# Patient Record
Sex: MALE | Race: WHITE | NOT HISPANIC OR LATINO | URBAN - METROPOLITAN AREA
[De-identification: names, ages, dates, MRNs, and addresses within clinical notes are randomized per-mention and may not be internally consistent; named-entity substitution may affect disease eponyms.]

---

## 2019-06-20 ENCOUNTER — IMPORTED ENCOUNTER (OUTPATIENT)
Dept: URBAN - METROPOLITAN AREA CLINIC 50 | Facility: CLINIC | Age: 59
End: 2019-06-20

## 2020-07-16 ENCOUNTER — IMPORTED ENCOUNTER (OUTPATIENT)
Dept: URBAN - METROPOLITAN AREA CLINIC 50 | Facility: CLINIC | Age: 60
End: 2020-07-16

## 2021-04-17 ASSESSMENT — VISUAL ACUITY
OD_CC: J1+
OD_CC: J1+@ 15 IN
OD_SC: 20/20
OS_CC: J1+@ 15 IN
OD_SC: 20/20-1
OS_SC: 20/30-1
OS_CC: J1+
OS_SC: 20/20-2

## 2021-04-17 ASSESSMENT — TONOMETRY
OD_IOP_MMHG: 14
OS_IOP_MMHG: 14
OD_IOP_MMHG: 13
OS_IOP_MMHG: 14

## 2021-07-09 ENCOUNTER — PREPPED CHART (OUTPATIENT)
Dept: URBAN - METROPOLITAN AREA CLINIC 48 | Facility: CLINIC | Age: 61
End: 2021-07-09

## 2021-07-09 ASSESSMENT — TONOMETRY
OS_IOP_MMHG: 14
OD_IOP_MMHG: 14

## 2021-07-09 ASSESSMENT — VISUAL ACUITY
OS_SC: 20/30-1
OS_CC: J1+
OD_SC: 20/20
OD_CC: J1+

## 2021-07-28 ENCOUNTER — ROUTINE EXAM (OUTPATIENT)
Dept: URBAN - METROPOLITAN AREA CLINIC 48 | Facility: CLINIC | Age: 61
End: 2021-07-28

## 2021-07-28 DIAGNOSIS — Z01.01: ICD-10-CM

## 2021-07-28 DIAGNOSIS — H52.4: ICD-10-CM

## 2021-07-28 PROCEDURE — 92014 COMPRE OPH EXAM EST PT 1/>: CPT

## 2021-07-28 PROCEDURE — 92015 DETERMINE REFRACTIVE STATE: CPT

## 2021-07-28 ASSESSMENT — VISUAL ACUITY
OS_PH: 20/20-2
OD_SC: 20/20
OU_CC: J1+
OU_SC: 20/20
OS_SC: 20/30+2

## 2021-07-28 ASSESSMENT — KERATOMETRY
OS_AXISANGLE_DEGREES: 170
OS_AXISANGLE2_DEGREES: 80
OD_AXISANGLE2_DEGREES: 180
OD_K1POWER_DIOPTERS: 42.25
OD_AXISANGLE_DEGREES: 090
OS_K2POWER_DIOPTERS: 42.25
OD_K2POWER_DIOPTERS: 42.25
OS_K1POWER_DIOPTERS: 42.50

## 2021-07-28 ASSESSMENT — TONOMETRY
OS_IOP_MMHG: 14
OD_IOP_MMHG: 14

## 2022-07-28 ENCOUNTER — COMPREHENSIVE EXAM (OUTPATIENT)
Dept: URBAN - METROPOLITAN AREA CLINIC 48 | Facility: CLINIC | Age: 62
End: 2022-07-28

## 2022-07-28 DIAGNOSIS — H43.812: ICD-10-CM

## 2022-07-28 DIAGNOSIS — H25.13: ICD-10-CM

## 2022-07-28 DIAGNOSIS — H52.4: ICD-10-CM

## 2022-07-28 DIAGNOSIS — Z01.01: ICD-10-CM

## 2022-07-28 DIAGNOSIS — H40.013: ICD-10-CM

## 2022-07-28 PROCEDURE — 92015 DETERMINE REFRACTIVE STATE: CPT

## 2022-07-28 PROCEDURE — 92014 COMPRE OPH EXAM EST PT 1/>: CPT

## 2022-07-28 ASSESSMENT — KERATOMETRY
OD_K2POWER_DIOPTERS: 42.25
OS_K1POWER_DIOPTERS: 42.50
OS_AXISANGLE_DEGREES: 170
OD_K1POWER_DIOPTERS: 42.25
OD_AXISANGLE_DEGREES: 090
OD_AXISANGLE2_DEGREES: 180
OS_AXISANGLE2_DEGREES: 80
OS_K2POWER_DIOPTERS: 42.25

## 2022-07-28 ASSESSMENT — VISUAL ACUITY
OU_CC: J1+ @16IN
OS_SC: 20/30
OD_GLARE: 20/30
OS_PH: 20/25-1
OS_GLARE: 20/40
OD_GLARE: 20/50
OS_GLARE: 20/30
OD_SC: 20/20

## 2022-07-28 ASSESSMENT — TONOMETRY
OD_IOP_MMHG: 16
OS_IOP_MMHG: 16

## 2022-11-01 ENCOUNTER — DIAGNOSTICS ONLY (OUTPATIENT)
Dept: URBAN - METROPOLITAN AREA CLINIC 53 | Facility: CLINIC | Age: 62
End: 2022-11-01

## 2022-11-01 DIAGNOSIS — H40.013: ICD-10-CM

## 2022-11-01 PROCEDURE — 92133 CPTRZD OPH DX IMG PST SGM ON: CPT

## 2022-11-01 PROCEDURE — 92083 EXTENDED VISUAL FIELD XM: CPT

## 2022-11-01 ASSESSMENT — KERATOMETRY
OD_AXISANGLE_DEGREES: 090
OD_K2POWER_DIOPTERS: 42.25
OS_AXISANGLE_DEGREES: 170
OD_AXISANGLE2_DEGREES: 180
OS_K1POWER_DIOPTERS: 42.50
OS_K2POWER_DIOPTERS: 42.25
OS_AXISANGLE2_DEGREES: 80
OD_K1POWER_DIOPTERS: 42.25

## 2023-08-01 ENCOUNTER — COMPREHENSIVE EXAM (OUTPATIENT)
Dept: URBAN - METROPOLITAN AREA CLINIC 48 | Facility: LOCATION | Age: 63
End: 2023-08-01

## 2023-08-01 DIAGNOSIS — Z01.01: ICD-10-CM

## 2023-08-01 DIAGNOSIS — H52.4: ICD-10-CM

## 2023-08-01 PROCEDURE — 92014 COMPRE OPH EXAM EST PT 1/>: CPT

## 2023-08-01 PROCEDURE — 92015 DETERMINE REFRACTIVE STATE: CPT

## 2023-08-01 ASSESSMENT — KERATOMETRY
OS_K1POWER_DIOPTERS: 42.50
OS_AXISANGLE2_DEGREES: 80
OD_K2POWER_DIOPTERS: 42.25
OD_AXISANGLE2_DEGREES: 45
OD_K1POWER_DIOPTERS: 42.00
OD_AXISANGLE_DEGREES: 135
OS_K2POWER_DIOPTERS: 42.25
OS_AXISANGLE_DEGREES: 170

## 2023-08-01 ASSESSMENT — VISUAL ACUITY
OD_GLARE: 20/20
OS_GLARE: 20/20
OD_GLARE: 20/25
OS_GLARE: 20/20
OU_CC: J1+@16
OS_PH: 20/20
OD_SC: 20/20
OS_SC: 20/30

## 2023-08-01 ASSESSMENT — TONOMETRY
OS_IOP_MMHG: 16
OD_IOP_MMHG: 16

## 2024-08-06 ENCOUNTER — COMPREHENSIVE EXAM (OUTPATIENT)
Dept: URBAN - METROPOLITAN AREA CLINIC 53 | Facility: CLINIC | Age: 64
End: 2024-08-06

## 2024-08-06 DIAGNOSIS — H43.812: ICD-10-CM

## 2024-08-06 DIAGNOSIS — H40.013: ICD-10-CM

## 2024-08-06 DIAGNOSIS — H25.13: ICD-10-CM

## 2024-08-06 DIAGNOSIS — Z01.01: ICD-10-CM

## 2024-08-06 DIAGNOSIS — H52.4: ICD-10-CM

## 2024-08-06 PROCEDURE — 92015 DETERMINE REFRACTIVE STATE: CPT

## 2024-08-06 PROCEDURE — 92014 COMPRE OPH EXAM EST PT 1/>: CPT

## 2024-08-06 ASSESSMENT — VISUAL ACUITY
OU_SC: 20/20
OD_GLARE: 20/20
OS_SC: 20/30
OD_SC: 20/20
OD_GLARE: 20/30
OU_CC: J1+@18"

## 2024-08-06 ASSESSMENT — TONOMETRY
OS_IOP_MMHG: 14
OD_IOP_MMHG: 14

## 2025-08-18 ENCOUNTER — COMPREHENSIVE EXAM (OUTPATIENT)
Age: 65
End: 2025-08-18

## 2025-08-18 DIAGNOSIS — H40.013: ICD-10-CM

## 2025-08-18 DIAGNOSIS — H25.13: ICD-10-CM

## 2025-08-18 DIAGNOSIS — Z98.890: ICD-10-CM

## 2025-08-18 DIAGNOSIS — H52.4: ICD-10-CM

## 2025-08-18 DIAGNOSIS — H43.812: ICD-10-CM

## 2025-08-18 DIAGNOSIS — Z01.01: ICD-10-CM

## 2025-08-18 PROCEDURE — 92014 COMPRE OPH EXAM EST PT 1/>: CPT

## 2025-08-18 PROCEDURE — 92015 DETERMINE REFRACTIVE STATE: CPT
